# Patient Record
Sex: MALE | Race: WHITE
[De-identification: names, ages, dates, MRNs, and addresses within clinical notes are randomized per-mention and may not be internally consistent; named-entity substitution may affect disease eponyms.]

---

## 2018-04-12 ENCOUNTER — HOSPITAL ENCOUNTER (EMERGENCY)
Dept: HOSPITAL 17 - NEPD | Age: 38
Discharge: HOME | End: 2018-04-12
Payer: COMMERCIAL

## 2018-04-12 VITALS — HEIGHT: 68 IN | WEIGHT: 220.46 LBS | BODY MASS INDEX: 33.41 KG/M2

## 2018-04-12 VITALS
HEART RATE: 131 BPM | SYSTOLIC BLOOD PRESSURE: 128 MMHG | DIASTOLIC BLOOD PRESSURE: 92 MMHG | OXYGEN SATURATION: 96 % | TEMPERATURE: 98.4 F | RESPIRATION RATE: 18 BRPM

## 2018-04-12 VITALS
DIASTOLIC BLOOD PRESSURE: 82 MMHG | SYSTOLIC BLOOD PRESSURE: 135 MMHG | OXYGEN SATURATION: 98 % | HEART RATE: 120 BPM | RESPIRATION RATE: 17 BRPM

## 2018-04-12 DIAGNOSIS — F41.9: ICD-10-CM
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
## 2018-04-12 DIAGNOSIS — F10.229: Primary | ICD-10-CM

## 2018-04-12 DIAGNOSIS — Z79.899: ICD-10-CM

## 2018-04-12 LAB
ALBUMIN SERPL-MCNC: 4.4 GM/DL (ref 3.4–5)
ALP SERPL-CCNC: 87 U/L (ref 45–117)
ALT SERPL-CCNC: 60 U/L (ref 12–78)
AST SERPL-CCNC: 43 U/L (ref 15–37)
BACTERIA #/AREA URNS HPF: (no result) /HPF
BASOPHILS # BLD AUTO: 0 TH/MM3 (ref 0–0.2)
BASOPHILS NFR BLD: 0.4 % (ref 0–2)
BILIRUB SERPL-MCNC: 0.8 MG/DL (ref 0.2–1)
BUN SERPL-MCNC: 16 MG/DL (ref 7–18)
CALCIUM SERPL-MCNC: 9 MG/DL (ref 8.5–10.1)
CHLORIDE SERPL-SCNC: 101 MEQ/L (ref 98–107)
COLOR UR: YELLOW
CREAT SERPL-MCNC: 1.49 MG/DL (ref 0.6–1.3)
EOSINOPHIL # BLD: 0 TH/MM3 (ref 0–0.4)
EOSINOPHIL NFR BLD: 0.3 % (ref 0–4)
ERYTHROCYTE [DISTWIDTH] IN BLOOD BY AUTOMATED COUNT: 14.7 % (ref 11.6–17.2)
GFR SERPLBLD BASED ON 1.73 SQ M-ARVRAT: 53 ML/MIN (ref 89–?)
GLUCOSE SERPL-MCNC: 84 MG/DL (ref 74–106)
GLUCOSE UR STRIP-MCNC: (no result) MG/DL
HCO3 BLD-SCNC: 18.4 MEQ/L (ref 21–32)
HCT VFR BLD CALC: 49.6 % (ref 39–51)
HGB BLD-MCNC: 16.5 GM/DL (ref 13–17)
HGB UR QL STRIP: (no result)
HYALINE CASTS #/AREA URNS LPF: 5 /LPF
KETONES UR STRIP-MCNC: 40 MG/DL
LYMPHOCYTES # BLD AUTO: 2.2 TH/MM3 (ref 1–4.8)
LYMPHOCYTES NFR BLD AUTO: 25.1 % (ref 9–44)
MCH RBC QN AUTO: 28.5 PG (ref 27–34)
MCHC RBC AUTO-ENTMCNC: 33.4 % (ref 32–36)
MCV RBC AUTO: 85.5 FL (ref 80–100)
MONOCYTE #: 0.9 TH/MM3 (ref 0–0.9)
MONOCYTES NFR BLD: 10.9 % (ref 0–8)
MUCOUS THREADS #/AREA URNS LPF: (no result) /LPF
NEUTROPHILS # BLD AUTO: 5.5 TH/MM3 (ref 1.8–7.7)
NEUTROPHILS NFR BLD AUTO: 63.3 % (ref 16–70)
NITRITE UR QL STRIP: (no result)
PLATELET # BLD: 390 TH/MM3 (ref 150–450)
PMV BLD AUTO: 7.3 FL (ref 7–11)
PROT SERPL-MCNC: 8.1 GM/DL (ref 6.4–8.2)
RBC # BLD AUTO: 5.8 MIL/MM3 (ref 4.5–5.9)
SODIUM SERPL-SCNC: 138 MEQ/L (ref 136–145)
SP GR UR STRIP: 1.02 (ref 1–1.03)
URINE LEUKOCYTE ESTERASE: (no result)
WBC # BLD AUTO: 8.7 TH/MM3 (ref 4–11)

## 2018-04-12 PROCEDURE — 96374 THER/PROPH/DIAG INJ IV PUSH: CPT

## 2018-04-12 PROCEDURE — 93005 ELECTROCARDIOGRAM TRACING: CPT

## 2018-04-12 PROCEDURE — 80307 DRUG TEST PRSMV CHEM ANLYZR: CPT

## 2018-04-12 PROCEDURE — 85025 COMPLETE CBC W/AUTO DIFF WBC: CPT

## 2018-04-12 PROCEDURE — 80053 COMPREHEN METABOLIC PANEL: CPT

## 2018-04-12 PROCEDURE — 96375 TX/PRO/DX INJ NEW DRUG ADDON: CPT

## 2018-04-12 PROCEDURE — 81001 URINALYSIS AUTO W/SCOPE: CPT

## 2018-04-12 PROCEDURE — 99284 EMERGENCY DEPT VISIT MOD MDM: CPT

## 2018-04-12 NOTE — PD
HPI


Chief Complaint:  Alcohol/Drug Intoxication


Time Seen by Provider:  18:43


Travel History


International Travel<30 days:  No


Contact w/Intl Traveler<30days:  No


Traveled to known affect area:  No





History of Present Illness


HPI


This patient is an alcohol binger about once a week.  He does not drink every 

day.  He is trying to cut back.  He also takes Valium twice a day for the last 

month for anxiety prescribed by his physician.  He feels nauseous and shaky.  

Severity is moderate.  Duration 2 days.  No alleviating factors.  Symptoms 

exacerbated by his anxiety and alcohol abuse.  Denies illicit drug use or any 

intentional overdose.  He is not feeling suicidal.





PFS


Past Medical History


Atrial Fibrillation:  Yes


Anxiety:  Yes


Depression:  Yes


Diminished Hearing:  No


Hypertension:  Yes


Pregnant?:  Not Pregnant





Past Surgical History


Surgical History:  No Previous Surgery





Social History


Alcohol Use:  Yes (last drink 1100)


Tobacco Use:  Yes (dip)


Substance Use:  No





Allergies-Medications


(Allergen,Severity, Reaction):  


Coded Allergies:  


     azithromycin (Verified  Allergy, Unknown, Anaphylaxis, 4/12/18)





Review of Systems


General / Constitutional:  No: Fever


Eyes:  No: Visual changes


HENT:  No: Headaches


Cardiovascular:  Positive: Tachycardia, No: Chest Pain or Discomfort


Respiratory:  No: Shortness of Breath


Gastrointestinal:  No: Abdominal Pain


Genitourinary:  No: Dysuria


Musculoskeletal:  Positive: Weakness, No: Pain


Skin:  No Rash


Neurologic:  Positive: Weakness, Tremor


Psychiatric:  Positive: Anxiety, No: Depression


Endocrine:  No: Polydipsia


Hematologic/Lymphatic:  No: Easy Bruising





Physical Exam


Narrative


GENERAL: Well-nourished, well-developed patient with nausea and weakness .


SKIN: Focused skin assessment reveals no rash and nodules. Skin is Warm and dry.


HEAD: Atraumatic. Normocephalic. 


EYES: Pupils equal and round. No scleral icterus. No injection or drainage. 


ENT: No nasal bleeding or discharge.  Mucous membranes pink and moist.


NECK: Trachea midline. No JVD. 


CARDIOVASCULAR: Regular rate and rhythm.  No murmur appreciated.  Tachycardic 

at 120


RESPIRATORY: No accessory muscle use. Clear to auscultation. Breath sounds 

equal bilaterally. 


GASTROINTESTINAL: Abdomen soft, non-tender, nondistended. Hepatic and splenic 

margins not palpable. 


MUSCULOSKELETAL: No obvious deformities. No clubbing.  No cyanosis.  No edema. 


NEUROLOGICAL: Awake and alert. No obvious cranial nerve deficits.  Motor 

grossly within normal limits. Normal speech.


PSYCHIATRIC: Anxious  mood and affect; insight and judgment seems weak .





Data


Data


Last Documented VS





Vital Signs








  Date Time  Temp Pulse Resp B/P (MAP) Pulse Ox O2 Delivery O2 Flow Rate FiO2


 


4/12/18 18:57  120 17 135/82 (99) 98 Room Air  


 


4/12/18 14:29 98.4       








Orders





 Orders


Electrocardiogram (4/12/18 14:33)


Complete Blood Count With Diff (4/12/18 14:33)


Comprehensive Metabolic Panel (4/12/18 14:33)


Urinalysis - C+S If Indicated (4/12/18 14:33)


Drug Screen, Random Urine (4/12/18 14:33)


Alcohol (Ethanol) (4/12/18 14:33)


Ondansetron Inj (Zofran Inj) (4/12/18 19:30)


Sodium Chlor 0.9% 1000 Ml Inj (Ns 1000 M (4/12/18 19:30)


Lorazepam Inj (Ativan Inj) (4/12/18 19:30)





Labs





Laboratory Tests








Test


  4/12/18


15:54 4/12/18


19:20


 


Urine Color YELLOW  


 


Urine Turbidity CLEAR  


 


Urine pH 5.5  


 


Urine Specific Gravity 1.025  


 


Urine Protein 100 mg/dL  


 


Urine Glucose (UA) NEG mg/dL  


 


Urine Ketones 40 mg/dL  


 


Urine Occult Blood TRACE  


 


Urine Nitrite NEG  


 


Urine Bilirubin NEG  


 


Urine Urobilinogen


  LESS THAN 2.0


MG/DL 


 


 


Urine Leukocyte Esterase NEG  


 


Urine RBC 6 /hpf  


 


Urine WBC 2 /hpf  


 


Urine Bacteria RARE /hpf  


 


Urine Hyaline Casts 5 /lpf  


 


Urine Mucus FEW /lpf  


 


Microscopic Urinalysis Comment


  CULT NOT


INDICATED 


 


 


Urine Opiates Screen NEG  


 


Urine Barbiturates Screen NEG  


 


Urine Amphetamines Screen NEG  


 


Urine Benzodiazepines Screen POS  


 


Urine Cocaine Screen NEG  


 


Urine Cannabinoids Screen NEG  


 


White Blood Count  8.7 TH/MM3 


 


Red Blood Count  5.80 MIL/MM3 


 


Hemoglobin  16.5 GM/DL 


 


Hematocrit  49.6 % 


 


Mean Corpuscular Volume  85.5 FL 


 


Mean Corpuscular Hemoglobin  28.5 PG 


 


Mean Corpuscular Hemoglobin


Concent 


  33.4 % 


 


 


Red Cell Distribution Width  14.7 % 


 


Platelet Count  390 TH/MM3 


 


Mean Platelet Volume  7.3 FL 


 


Neutrophils (%) (Auto)  63.3 % 


 


Lymphocytes (%) (Auto)  25.1 % 


 


Monocytes (%) (Auto)  10.9 % 


 


Eosinophils (%) (Auto)  0.3 % 


 


Basophils (%) (Auto)  0.4 % 


 


Neutrophils # (Auto)  5.5 TH/MM3 


 


Lymphocytes # (Auto)  2.2 TH/MM3 


 


Monocytes # (Auto)  0.9 TH/MM3 


 


Eosinophils # (Auto)  0.0 TH/MM3 


 


Basophils # (Auto)  0.0 TH/MM3 


 


CBC Comment  DIFF FINAL 


 


Differential Comment   


 


Blood Urea Nitrogen  16 MG/DL 


 


Creatinine  1.49 MG/DL 


 


Random Glucose  84 MG/DL 


 


Total Protein  8.1 GM/DL 


 


Albumin  4.4 GM/DL 


 


Calcium Level  9.0 MG/DL 


 


Alkaline Phosphatase  87 U/L 


 


Aspartate Amino Transf


(AST/SGOT) 


  43 U/L 


 


 


Alanine Aminotransferase


(ALT/SGPT) 


  60 U/L 


 


 


Total Bilirubin  0.8 MG/DL 


 


Sodium Level  138 MEQ/L 


 


Potassium Level  4.5 MEQ/L 


 


Chloride Level  101 MEQ/L 


 


Carbon Dioxide Level  18.4 MEQ/L 


 


Anion Gap  19 MEQ/L 


 


Estimat Glomerular Filtration


Rate 


  53 ML/MIN 


 


 


Ethyl Alcohol Level  134 MG/DL 











[facility]


Medical Decision Making


Medical Screen Exam Complete:  Yes


Emergency Medical Condition:  Yes


Medical Record Reviewed:  Yes


Differential Diagnosis


Valium withdrawal, alcohol withdrawal, alcohol intoxication, anxiety attack


Narrative Course


I have reviewed the patient's electronic medical record.





IV placed and IV Zofran and IV Ativan and 1 L normal saline IV bolus given


Lab studies sent


Urine is clean


Urine tox screen positive for benzodiazepine which he is prescribed





Alcohol level is 134


CBC normal and electrolytes normal





On recheck he looks improved


Stable for outpatient follow-up


He is acutely intoxicated but I do not believe he is in withdrawal


Gave him a recommendation for [facility] outpatient alcohol rehabilitation 

services





Diagnosis





 Primary Impression:  


 Acute alcohol intoxication with alcoholism


 Qualified Codes:  F10.229 - Alcohol dependence with intoxication, unspecified


 Additional Impressions:  


 Anxiety


 Chronic prescription benzodiazepine use





***Additional Instructions:  


The patient was advised to follow up with their physician and return if they 

worsen.


Discuss long-term wean of Valium with your prescribing physician


Recommend [facility] alcohol rehabilitation services


***Med/Other Pt SpecificInfo:  Other


Disposition:  01 DISCHARGE HOME


Condition:  Stable











Alex Duran MD Apr 12, 2018 19:30

## 2018-04-13 NOTE — EKG
Date Performed: 04/12/2018       Time Performed: 15:35:08

 

PTAGE:      37 years

 

EKG:      SINUS TACHYCARDIA WITH SHORT TX INTERVAL SEPTAL MYOCARDIAL INFARCTION ABNORMAL ECG 

 

NO PREVIOUS TRACING            

 

DOCTOR:   Haris Medina  Interpretating Date/Time  04/13/2018 19:29:07